# Patient Record
Sex: MALE | Race: ASIAN | NOT HISPANIC OR LATINO | URBAN - METROPOLITAN AREA
[De-identification: names, ages, dates, MRNs, and addresses within clinical notes are randomized per-mention and may not be internally consistent; named-entity substitution may affect disease eponyms.]

---

## 2024-02-12 ENCOUNTER — LAB (OUTPATIENT)
Dept: URBAN - METROPOLITAN AREA CLINIC 50 | Facility: CLINIC | Age: 44
End: 2024-02-12

## 2024-02-12 ENCOUNTER — OV NP (OUTPATIENT)
Dept: URBAN - METROPOLITAN AREA CLINIC 50 | Facility: CLINIC | Age: 44
End: 2024-02-12
Payer: COMMERCIAL

## 2024-02-12 VITALS
WEIGHT: 178.4 LBS | HEART RATE: 67 BPM | TEMPERATURE: 97.7 F | HEIGHT: 69 IN | DIASTOLIC BLOOD PRESSURE: 73 MMHG | BODY MASS INDEX: 26.42 KG/M2 | SYSTOLIC BLOOD PRESSURE: 100 MMHG

## 2024-02-12 DIAGNOSIS — K62.5 BRBPR (BRIGHT RED BLOOD PER RECTUM): ICD-10-CM

## 2024-02-12 DIAGNOSIS — R19.5 HARD STOOL: ICD-10-CM

## 2024-02-12 DIAGNOSIS — K64.4 EXTERNAL HEMORRHOID: ICD-10-CM

## 2024-02-12 PROBLEM — 23913003: Status: ACTIVE | Noted: 2024-02-12

## 2024-02-12 PROBLEM — 75295004: Status: ACTIVE | Noted: 2024-02-12

## 2024-02-12 PROCEDURE — 99244 OFF/OP CNSLTJ NEW/EST MOD 40: CPT | Performed by: PHYSICIAN ASSISTANT

## 2024-02-12 RX ORDER — POLYETHYLENE GLYCOL 3350, SODIUM SULFATE, SODIUM CHLORIDE, POTASSIUM CHLORIDE, ASCORBIC ACID, SODIUM ASCORBATE 140-9-5.2G
480ML KIT ORAL
Qty: 1 | Refills: 0 | OUTPATIENT
Start: 2024-02-12 | End: 2024-02-13

## 2024-02-12 NOTE — HPI-TODAY'S VISIT:
Patient is 42 y/o M of [facility] here to discuss anal concerns Reports 3-4 weeks ago, had some blood on stool, bright red, and again last week w hard stool 2 weeks ago, saw PCP who recommended GI consult for concerns and consider colonoscopy Was also told had a small lump in anal area at that time w PCP Was given Rx for a cream which reduced lump/swelling since then - hydrocortisone 2.5% No abdominal pains, no nausea/vomiting Bowel habits once daily, no persistent blood, no mucus/melena Denies straining, but does admit on days w blood had some straining and hard stools No nausea/vomiting, no appetite or unexpected weight change - intentional wt management w diabetes Denies hx anemia -- last labs w PCP 2 weeks ago, but only diabetes check, no anemia noted in past though No kidney/heart/lung disease, can do stairs, not on blood thinners

## 2024-02-12 NOTE — PHYSICAL EXAM GASTROINTESTINAL
Abdomen , soft, nontender, nondistended , no guarding or rigidity , no masses palpable , normal bowel sounds , Liver and Spleen,  no hepatosplenomegaly , liver nontender, Rectal, 2 non-thrombosed, nonbleeding, nontender external hemorrhoids noted, normal sphincter tone, no internal hemorrhoids, rectal masses or bleeding present

## 2024-02-28 ENCOUNTER — COLON (OUTPATIENT)
Dept: URBAN - METROPOLITAN AREA SURGERY CENTER 18 | Facility: SURGERY CENTER | Age: 44
End: 2024-02-28

## 2024-03-28 ENCOUNTER — OV NP (OUTPATIENT)
Dept: URBAN - METROPOLITAN AREA CLINIC 50 | Facility: CLINIC | Age: 44
End: 2024-03-28